# Patient Record
(demographics unavailable — no encounter records)

---

## 2024-12-02 NOTE — HISTORY OF PRESENT ILLNESS
[de-identified] : This 76-year-old healthy gentleman seen for evaluation of pain along the lateral aspect of his right hip.  This has been episodic over the past 2-3 weeks after doing physical work.  This has not been associated with any swelling back pain.  His pain does not radiate distally no numbness or paresthesias General Health is excellent

## 2024-12-02 NOTE — PROCEDURE
[FreeTextEntry1] : Under sterile technique with a Betadine prep the right trochanteric bursa of the hip was injected sterilely with 5 cc of 1% lidocaine and 40 mg of methylprednisolone with a Band-Aid dressing applied at the conclusion this was tolerated without incident

## 2024-12-02 NOTE — PHYSICAL EXAM
[de-identified] : Exam today reveals no significant limp he has good motion to the right hip very mild restriction of full abduction and internal rotation no tenderness over the buttock or anterior hip capsule he does have tenderness over the trochanteric bursal region.  No overt inflammatory changes noted neurologically intact.  X-rays ordered and taken today of the right hip mild degenerative change no lesion

## 2024-12-02 NOTE — ASSESSMENT
[FreeTextEntry1] : Impression: Trochanteric bursitis right hip.  I have injected the bursa he has been placed on Mobic with GI precautions return as needed

## 2024-12-17 NOTE — ASSESSMENT
[FreeTextEntry1] : Trochanteric bursitis right hip  Because he is not having significant pain at this time I have recommended continued use of anti-inflammatory medication and relative rest.  He will resume walking but not in the usual 3 mile distances he is used to.  He has been instructed to return for reinjection if the serious amount of pain returns.

## 2024-12-17 NOTE — HISTORY OF PRESENT ILLNESS
[de-identified] : This 76-year-old male returns for reevaluation of a trochanteric bursitis of the right hip.  This area was injected 2 weeks ago but he states that when attempting to walk long distances he had a reexacerbation of the lateral right hip pain.  After taking Advil and relative rest he no longer has the pain.

## 2024-12-17 NOTE — PHYSICAL EXAM
[de-identified] : On physical examination he can walk without a limp.  He does have some tenderness in the region of the greater trochanter but it is not a significant amount of pain on palpating that area.  There is a full range of motion of the right hip.